# Patient Record
Sex: FEMALE | Race: WHITE | URBAN - METROPOLITAN AREA
[De-identification: names, ages, dates, MRNs, and addresses within clinical notes are randomized per-mention and may not be internally consistent; named-entity substitution may affect disease eponyms.]

---

## 2017-10-23 ENCOUNTER — TELEPHONE (OUTPATIENT)
Dept: DERMATOLOGY | Facility: CLINIC | Age: 37
End: 2017-10-23

## 2017-10-23 NOTE — TELEPHONE ENCOUNTER
Attempted to contact pt.  No answer.  Message left on voicemail to return call to [facility] at [phone].  Sooner appt with Dr. Ramos is available.    Pt on Wait list and is currently scheduled with Dr. Ramos 03/08/17 at 7:45am.    Lizzy Mendoza LPN

## 2017-10-25 NOTE — TELEPHONE ENCOUNTER
Pt called, no answer. VM Id's pt. Left message for pt to call the [facility] clinic back at [phone]....Yuli Emmanuel RN

## 2018-02-26 ENCOUNTER — PRE VISIT (OUTPATIENT)
Dept: DERMATOLOGY | Facility: CLINIC | Age: 38
End: 2018-02-26

## 2018-02-26 NOTE — TELEPHONE ENCOUNTER
**LOOK AT HEALTH MAINTENANCE**        Dermatology Pre-visit Call:    Reason for visit : Skin Check     Any personal history of skin cancers: No    Any family history of skin cancers: No    Was the patient referred: No    If the patient was referred, are records obtained: NA    Has the patient seen a dermatologist in the past: No. Records obtained: NA    Patient Reminders Given:  --Please, make sure you bring an updated list of your medications, allergies and insurance information.  --Plan on being in our facility for approximately one hour, this includes the registration process, office visit, education and check-out process.  If you are having a procedure, more time may be required.     --If you are having a procedure, please, present 15 minutes early.  --We are located on the second floor of the building, check in desk D.   --If you need to cancel or reschedule, call [phone].  --We look forward to seeing you in Dermatology Clinic.         Sue Ruiz, CMA

## 2018-03-08 ENCOUNTER — OFFICE VISIT (OUTPATIENT)
Dept: DERMATOLOGY | Facility: CLINIC | Age: 38
End: 2018-03-08
Payer: COMMERCIAL

## 2018-03-08 DIAGNOSIS — L81.4 SOLAR LENTIGINOSIS: ICD-10-CM

## 2018-03-08 DIAGNOSIS — D22.9 MULTIPLE BENIGN NEVI: Primary | ICD-10-CM

## 2018-03-08 PROCEDURE — 99203 OFFICE O/P NEW LOW 30 MIN: CPT | Performed by: DERMATOLOGY

## 2018-03-08 NOTE — PROGRESS NOTES
[facility] Dermatology Note      Dermatology Problem List:  1. Pt reported hx of eczema    Encounter Date: Mar 8, 2018    CC:  Chief Complaint   Patient presents with     Skin Check     Lots of freckles         History of Present Illness:  Ms. Annette Abbasi is a 37 year old female who with no history of skin cancer presents for a skin check.  Patient states that she has a lot of freckles and would like to have a full body skin check.  She has a lesion of concern on her left helix. The patient has a personal history of eczema, but it it stable now.  She uses sun screen regularly. She has a history of mole removal that was a benign nevus that was removed due to catching. No other lesions of concern  Not pregnant or breastfeeding.   Past Medical History:   There is no problem list on file for this patient.    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    Family History:  There is no family history of skin cancer. Father has eczema and psoriasis.     Medications:  No current outpatient prescriptions on file.       Allergies   Allergen Reactions     Pepto-Bismol [Bismuth Subsalicylate] Blisters       Review of Systems:     -Constitutional: The patient denies fatigue, fevers, chills, unintended weight loss, and night sweats.  -Skin: As above in HPI. No additional skin concerns.    Physical exam:  Vitals: There were no vitals taken for this visit.  GEN: This is a well developed, well-nourished female in no acute distress, in a pleasant mood.    SKIN: Total skin excluding the undergarment areas was performed. The exam included the head/face, neck, both arms, chest, back, abdomen, both legs, digits and/or nails. Declines genital exam.  decliens genital exam  -Scattered brown macules on sun exposed areas, including left helix.  -Multiple regular brown pigmented macules and papules are identified on the trunk and extremities.   -5 mm pig macule right mid back with erythema  -No other lesions of concern on areas examined.       Impression/Plan:  1. 5 mm pig macule right mid back with erythema. Consistent with inflamed nevus.     Photo taken today     Will recheck in 3 months    2. Multiple clinically benign nevi on the trunk and extremities    No further intervention required. Patient to report changes.     Recommend sunscreens SPF #30 or greater, protective clothing and avoidance of tanning beds.    3. Solar lentigines    No further intervention required. Patient to report changes.     Recommend sunscreens SPF #30 or greater, protective clothing and avoidance of tanning beds.        Follow-up in 3 months, earlier for new or changing lesions.       Staff Involved:  Scribe/Staff    Scribe Disclosure:   Sue PELLETIER, am serving as a scribe to document services personally performed by Dr. Cinda Ramos, based on data collection and the provider's statements to me.     Scribe Disclosure:   IVandana, am serving as a scribe to document services personally performed by Dr. Cinda Ramos, based on data collection and the provider's statements to me.     Provider Disclosure:   The documentation recorded by the scribe accurately reflects the services I personally performed and the decisions made by me.    Cinda Ramos MD    Department of Dermatology  [facility]: Phone: [phone], Fax:[phone]  [facility]: Phone: [phone], Fax: [phone]

## 2018-03-08 NOTE — LETTER
3/8/2018         RE: Annette Abbasi  [address] [place] [address]        Dear Colleague,    Thank you for referring your patient, Annette Abbasi, to the [email or web address] Please see a copy of my visit note below.    [facility] Dermatology Note      Dermatology Problem List:  1. Pt reported hx of eczema    Encounter Date: Mar 8, 2018    CC:  Chief Complaint   Patient presents with     Skin Check     Lots of freckles         History of Present Illness:  Ms. Annette Abbasi is a 37 year old female who with no history of skin cancer presents for a skin check.  Patient states that she has a lot of freckles and would like to have a full body skin check.  She has a lesion of concern on her left helix. The patient has a personal history of eczema, but it it stable now.  She uses sun screen regularly. She has a history of mole removal that was a benign nevus that was removed due to catching. No other lesions of concern  Not pregnant or breastfeeding.   Past Medical History:   There is no problem list on file for this patient.    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    Family History:  There is no family history of skin cancer. Father has eczema and psoriasis.     Medications:  No current outpatient prescriptions on file.       Allergies   Allergen Reactions     Pepto-Bismol [Bismuth Subsalicylate] Blisters       Review of Systems:     -Constitutional: The patient denies fatigue, fevers, chills, unintended weight loss, and night sweats.  -Skin: As above in HPI. No additional skin concerns.    Physical exam:  Vitals: There were no vitals taken for this visit.  GEN: This is a well developed, well-nourished female  in no acute distress, in a pleasant mood.    SKIN: Total skin excluding the undergarment areas was performed. The exam included the head/face, neck, both arms, chest, back, abdomen, both legs, digits and/or nails. Declines genital exam. decliens genital exam  -Scattered brown macules on sun exposed areas, including left helix.  -Multiple regular brown pigmented macules and papules are identified on the trunk and extremities.   -5 mm pig macule right mid back with erythema  -No other lesions of concern on areas examined.       Impression/Plan:  1. 5 mm pig macule right mid back with erythema. Consistent with inflamed nevus.     Photo taken today     Will recheck in 3 months    2. Multiple clinically benign nevi on the trunk and extremities    No further intervention required. Patient to report changes.     Recommend sunscreens SPF #30 or greater, protective clothing and avoidance of tanning beds.    3. Solar lentigines    No further intervention required. Patient to report changes.     Recommend sunscreens SPF #30 or greater, protective clothing and avoidance of tanning beds.        Follow-up in 3 months, earlier for new or changing lesions.       Staff Involved:  Scribe/Staff    Scribe Disclosure:   ISue, am serving as a scribe to document services personally performed by Dr. Cinda Ramos, based on data collection and the provider's statements to me.     Scribe Disclosure:   IVandana, am serving as a scribe to document services personally performed by Dr. Cinda Ramos, based on data collection and the provider's statements to me.     Provider Disclosure:   The documentation recorded by the scribe accurately reflects the services I personally performed and the decisions made by me.    Cinda Ramos MD    Department of Dermatology  [facility]: Phone: [phone], Fax:[phone]  [facility]  [facility]: Phone: [phone], Fax: [phone]        Again, thank you for allowing me to participate in the care of your patient.        Sincerely,        Cinda Ramos MD

## 2018-03-08 NOTE — MR AVS SNAPSHOT
After Visit Summary   3/8/2018    Annette Abbasi    MRN: [id number]           Patient Information     Date Of Birth          1980        Visit Information        Provider Department      3/8/2018 7:45 AM Cinda Ramos MD [facility]        Today's Diagnoses     Multiple benign nevi    -  1    Solar lentiginosis          Care Instructions                      Follow-ups after your visit        Follow-up notes from your care team     Return in about 3 months (around 2018).      Who to contact     If you have questions or need follow up information about today's clinic visit or your schedule please contact [facility] directly at [phone].  Normal or non-critical lab and imaging results will be communicated to you by MyChart, letter or phone within 4 business days after the clinic has received the results. If you do not hear from us within 7 days, please contact the clinic through MyChart or phone. If you have a critical or abnormal lab result, we will notify you by phone as soon as possible.  Submit refill requests through [employer] or call your pharmacy and they will forward the refill request to us. Please allow 3 business days for your refill to be completed.          Additional Information About Your Visit        MyChart Information     [employer] is an electronic gateway that provides easy, online access to your medical records. With [employer], you can request a clinic appointment, read your test results, renew a prescription or communicate with your care team.     To sign up for [employer] visit the website at [email or web address]   You will be asked to enter the access code listed below, as well as some personal information. Please follow the directions to create your username and password.     Your access code is: [email or web address]  Expires: 2018  8:13 AM     Your access code will  in 90 days. If you need help or a new code, please contact your  [facility] or call [phone] for assistance.        Care EveryWhere ID     This is your Care EveryWhere ID. This could be used by other organizations to access your [facility] medical records  [id number]         Blood Pressure from Last 3 Encounters:   No data found for BP    Weight from Last 3 Encounters:   No data found for Wt              Today, you had the following     No orders found for display       Primary Care Provider    None Specified       No primary provider on file.        Equal Access to Services     EDILSON DORMAN : Hadii aad ku hadasho Sostarrali, waaxda luqadaha, qaybta kaalmada adeegyada, jere guthrieignaciazafar tee . So [facility] [phone].    ATENCIÓN: Si habla español, tiene a dunn disposición servicios gratuitos de asistencia lingüística. Llame al [phone].    We comply with applicable federal civil rights laws and [place] laws. We do not discriminate on the basis of race, color, national origin, age, disability, sex, sexual orientation, or gender identity.            Thank you!     Thank you for choosing [facility]  for your care. Our goal is always to provide you with excellent care. Hearing back from our patients is one way we can continue to improve our services. Please take a few minutes to complete the written survey that you may receive in the mail after your visit with us. Thank you!             Your Updated Medication List - Protect others around you: Learn how to safely use, store and throw away your medicines at www.disposemymeds.org.      Notice  As of 3/8/2018  8:13 AM    You have not been prescribed any medications.